# Patient Record
Sex: FEMALE | Race: WHITE | Employment: OTHER | URBAN - METROPOLITAN AREA
[De-identification: names, ages, dates, MRNs, and addresses within clinical notes are randomized per-mention and may not be internally consistent; named-entity substitution may affect disease eponyms.]

---

## 2021-02-18 ENCOUNTER — IMMUNIZATION (OUTPATIENT)
Dept: PRIMARY CARE CLINIC | Age: 71
End: 2021-02-18
Payer: MEDICARE

## 2021-02-18 PROCEDURE — 91301 COVID-19, MODERNA VACCINE 100MCG/0.5ML DOSE: CPT | Performed by: FAMILY MEDICINE

## 2021-02-18 PROCEDURE — 0011A COVID-19, MODERNA VACCINE 100MCG/0.5ML DOSE: CPT | Performed by: FAMILY MEDICINE

## 2021-03-18 ENCOUNTER — IMMUNIZATION (OUTPATIENT)
Dept: PRIMARY CARE CLINIC | Age: 71
End: 2021-03-18
Payer: MEDICARE

## 2021-03-18 PROCEDURE — 0012A PR IMM ADMN SARSCOV2 100 MCG/0.5 ML 2ND DOSE: CPT | Performed by: NURSE PRACTITIONER

## 2021-03-18 PROCEDURE — 91301 COVID-19, MODERNA VACCINE 100MCG/0.5ML DOSE: CPT | Performed by: NURSE PRACTITIONER

## 2021-11-11 ENCOUNTER — OFFICE VISIT (OUTPATIENT)
Dept: CARDIOLOGY CLINIC | Age: 71
End: 2021-11-11
Payer: MEDICARE

## 2021-11-11 VITALS
BODY MASS INDEX: 41.03 KG/M2 | HEIGHT: 60 IN | DIASTOLIC BLOOD PRESSURE: 70 MMHG | HEART RATE: 64 BPM | SYSTOLIC BLOOD PRESSURE: 120 MMHG | WEIGHT: 209 LBS

## 2021-11-11 DIAGNOSIS — R00.2 PALPITATION: Primary | ICD-10-CM

## 2021-11-11 DIAGNOSIS — G47.33 OSA ON CPAP: ICD-10-CM

## 2021-11-11 DIAGNOSIS — Z99.89 OSA ON CPAP: ICD-10-CM

## 2021-11-11 PROCEDURE — G8417 CALC BMI ABV UP PARAM F/U: HCPCS | Performed by: INTERNAL MEDICINE

## 2021-11-11 PROCEDURE — 3017F COLORECTAL CA SCREEN DOC REV: CPT | Performed by: INTERNAL MEDICINE

## 2021-11-11 PROCEDURE — 93246 EXT ECG>7D<15D RECORDING: CPT | Performed by: INTERNAL MEDICINE

## 2021-11-11 PROCEDURE — 1123F ACP DISCUSS/DSCN MKR DOCD: CPT | Performed by: INTERNAL MEDICINE

## 2021-11-11 PROCEDURE — 1036F TOBACCO NON-USER: CPT | Performed by: INTERNAL MEDICINE

## 2021-11-11 PROCEDURE — G8427 DOCREV CUR MEDS BY ELIG CLIN: HCPCS | Performed by: INTERNAL MEDICINE

## 2021-11-11 PROCEDURE — 1090F PRES/ABSN URINE INCON ASSESS: CPT | Performed by: INTERNAL MEDICINE

## 2021-11-11 PROCEDURE — G8484 FLU IMMUNIZE NO ADMIN: HCPCS | Performed by: INTERNAL MEDICINE

## 2021-11-11 PROCEDURE — 4040F PNEUMOC VAC/ADMIN/RCVD: CPT | Performed by: INTERNAL MEDICINE

## 2021-11-11 PROCEDURE — 93000 ELECTROCARDIOGRAM COMPLETE: CPT | Performed by: INTERNAL MEDICINE

## 2021-11-11 PROCEDURE — G8400 PT W/DXA NO RESULTS DOC: HCPCS | Performed by: INTERNAL MEDICINE

## 2021-11-11 PROCEDURE — 99203 OFFICE O/P NEW LOW 30 MIN: CPT | Performed by: INTERNAL MEDICINE

## 2021-11-11 RX ORDER — BUPROPION HYDROCHLORIDE 200 MG/1
200 TABLET, EXTENDED RELEASE ORAL 2 TIMES DAILY
COMMUNITY

## 2021-11-11 RX ORDER — ASPIRIN 81 MG/1
81 TABLET ORAL DAILY
COMMUNITY

## 2021-11-11 RX ORDER — LEVOTHYROXINE SODIUM 137 UG/1
TABLET ORAL
COMMUNITY
Start: 2021-09-18

## 2021-11-11 NOTE — PROGRESS NOTES
[facility]   Cardiac Consultation    Referring Provider:  No primary care provider on file. Chief Complaint   Patient presents with    New Patient     Irregular Heart beat    Palpitations     HPI:  Aislinn Mane is a 78 y.o. female referred by Dr. Helena San for irregular heartbeats. PMH significant for severe IGNACIA on CPAP for the past 25 yrs and palpitations. She has noticed palpitations for most of her life, described as an occasional skipped beat. About 1-2 times a year for the past 20 yrs, she felt forceful palpitations, sometimes rapid, that lasts about 7-10 sec. For the past 3-4 weeks, these episodes now occur 1-2 times a week. She lost about 37 lbs after COVID first hit, but she has gained it all back. The palpitations have increased in frequency for the past 3-4 weeks. She reports compliance with CPAP and sees the sleep physician annually ([facility]). She denies HTN. She has no other cardiac sx and her AHI is low per patient on C-pap. Past Medical History:   has no past medical history on file. Surgical History:   has no past surgical history on file. Social History:   reports that she has never smoked. She has never used smokeless tobacco.     Family History:  family history is not on file. Home Medications:  Outpatient Encounter Medications as of 11/11/2021   Medication Sig Dispense Refill    buPROPion (WELLBUTRIN SR) 200 MG extended release tablet Take 200 mg by mouth 2 times daily      levothyroxine (SYNTHROID) 137 MCG tablet       SERTRALINE HCL PO Take by mouth      aspirin 81 MG EC tablet Take 81 mg by mouth daily       No facility-administered encounter medications on file as of 11/11/2021. Allergies:  Erythromycin     Review of Systems   Constitutional: Negative for activity change and appetite change. HENT: Negative for facial swelling and neck pain. Eyes: Negative for discharge and itching.     Respiratory: Negative for chest tightness and shortness of breath. Cardiovascular: palpitations. Negative for chest pain. Negative for leg swelling. Gastrointestinal: Negative for abdominal pain and abdominal distention. Genitourinary: Negative. Musculoskeletal: Negative. Skin: Negative for color change and pallor. Neurological: Negative for dizziness, syncope and light-headedness. Hematological: Negative. Psychiatric/Behavioral: Negative for behavioral problems and agitation. /70   Pulse 64   Ht 5' (1.524 m)   Wt 209 lb (94.8 kg)   BMI 40.82 kg/m²     ECG 11/11/21, personally reviewed. SR 64    Objective:  Physical Exam   Nursing note and vitals reviewed. Constitutional: She appears well-developed and well-nourished. obese  Head:  atraumatic. Eyes: Right eye exhibits no discharge. Left eye exhibits no discharge. Neck: Neck supple. Cardiovascular: Normal rate, regular rhythm and normal heart sounds. Pulmonary/Chest: Effort normal and breath sounds normal.   Abdominal: Soft. Musculoskeletal: She exhibits no edema. Neurological: She is alert without any gross abnormalities. Skin: Skin is warm and dry. Psychiatric: She has a normal mood and affect. Assessment:  1. Palpitation - Longstanding h/o forceful palpitations that have increased in frequency over the past 3-4 weeks. 2. Severe IGNACIA on CPAP - compliant with CPAP     Suspect atrial tach episodes which are generally benign, but will do 14 day monitor to affirm dx. Since she has gained weight and has Sleep apnea with rapid palps, will order echo to assess for LVH, PHT CM, and atrial size. The advantage of weight reduction with palps( especially atrial) reviewed and encouraged. Plan:  1.  Echo to assess LVEF, structure, and valves  2. Obtain 2 week CAM to correlate her rhythm with her symptoms  3.   Follow up with me in about 4 weeks, after testing at [address] [place] [address], Charlette Glass RN, am scribing for and in the presence of Dr. Tessie Hendrix Kelsy. 11/11/21 1:43 PM  Morene Hammans, RN  The scribes documentation has been prepared under my direction and personally reviewed by me in its entirety. I confirm that the note above accurately reflects all work, treatment, procedures, and medical decision making performed by me. Azael Marinelli.  Kelsy MENDOZA

## 2021-11-12 ENCOUNTER — HOSPITAL ENCOUNTER (OUTPATIENT)
Dept: NON INVASIVE DIAGNOSTICS | Age: 71
Discharge: HOME | End: 2021-11-12
Payer: MEDICARE

## 2021-11-12 DIAGNOSIS — G47.33 OSA ON CPAP: ICD-10-CM

## 2021-11-12 DIAGNOSIS — R00.2 PALPITATION: ICD-10-CM

## 2021-11-12 DIAGNOSIS — Z99.89 OSA ON CPAP: ICD-10-CM

## 2021-11-12 LAB
LV EF: 63 %
LVEF MODALITY: NORMAL

## 2021-11-12 PROCEDURE — 93306 TTE W/DOPPLER COMPLETE: CPT

## 2021-11-16 ENCOUNTER — TELEPHONE (OUTPATIENT)
Dept: CARDIOLOGY CLINIC | Age: 71
End: 2021-11-16

## 2021-11-16 NOTE — TELEPHONE ENCOUNTER
Placed a holter monitor on pt. Date placed was 11/11/2021.  [facility] # is [id number]

## 2021-12-09 DIAGNOSIS — R00.2 PALPITATION: ICD-10-CM

## 2021-12-09 PROCEDURE — 93248 EXT ECG>7D<15D REV&INTERPJ: CPT | Performed by: INTERNAL MEDICINE

## 2021-12-09 NOTE — PROGRESS NOTES
[facility]   Cardiac Consultation    Referring Provider:  Otilio Manrique MD     Chief Complaint   Patient presents with    1 Month Follow-Up     c/o heart flutters still      HPI:  Liz Lorenzana is a 73 y.o. female referred by Dr. Jame Dia for irregular heartbeats. PMH significant for severe IGNACIA on CPAP for the past 25 yrs and palpitations. She has noticed palpitations for most of her life, described as an occasional skipped beat. About 1-2 times a year for the past 20 yrs, she felt forceful palpitations, sometimes rapid, that lasts about 7-10 sec. For the past 3-4 weeks, these episodes now occur 1-2 times a week, more commonly. She lost about 37 lbs after COVID first hit, but she has gained it all back. The palpitations have increased in frequency for the past 3-4 weeks. She reports compliance with CPAP and sees the sleep physician annually ([facility]). She denies HTN. She has no other cardiac sx and her AHI is low per patient on C-pap. Interval History: Today, she present to office for follow up. She continues to report palpitations. She is compliant with her medications. She denies chest pain/pressure, tightness, edema, shortness of breath, heart racing, palpitations, lightheadedness, dizziness, syncope, presyncope,  PND or orthopnea. Past Medical History:   has no past medical history on file. Surgical History:   has no past surgical history on file. Social History:   reports that she has never smoked. She has never used smokeless tobacco.     Family History:  family history is not on file.      Home Medications:  Outpatient Encounter Medications as of 12/14/2021   Medication Sig Dispense Refill    buPROPion (WELLBUTRIN SR) 200 MG extended release tablet Take 200 mg by mouth 2 times daily      levothyroxine (SYNTHROID) 137 MCG tablet       SERTRALINE HCL PO Take 1.5 tablets by mouth daily       aspirin 81 MG EC tablet Take 81 mg by mouth daily       No facility-administered encounter medications on file as of 12/14/2021. Allergies:  Erythromycin     Review of Systems   Constitutional: Negative for activity change and appetite change. HENT: Negative for facial swelling and neck pain. Eyes: Negative for discharge and itching. Respiratory: Negative for chest tightness and shortness of breath. Cardiovascular: palpitations. Negative for chest pain. Negative for leg swelling. Gastrointestinal: Negative for abdominal pain and abdominal distention. Genitourinary: Negative. Musculoskeletal: Negative. Skin: Negative for color change and pallor. Neurological: Negative for dizziness, syncope and light-headedness. Hematological: Negative. Psychiatric/Behavioral: Negative for behavioral problems and agitation. /60   Pulse 64   Ht 5' (1.524 m)   Wt 210 lb 12.8 oz (95.6 kg)   SpO2 98%   BMI 41.17 kg/m²     ECG 12/14/2021 reviewed, sinus rhythm, short MA syndrome Vladimir = 118with v-rate of 65 bpm with QRS duration 88 ms. No pathologic Q waves, ventricular pre-excitation, or QT prolongation. Objective:  Physical Exam   Nursing note and vitals reviewed. Constitutional: She appears well-developed and well-nourished. obese  Head:  atraumatic. Eyes: Right eye exhibits no discharge. Left eye exhibits no discharge. Neck: Neck supple. Cardiovascular: Normal rate, regular rhythm and normal heart sounds. Pulmonary/Chest: Effort normal and breath sounds normal.   Abdominal: Soft. Musculoskeletal: She exhibits no edema. Neurological: She is alert without any gross abnormalities. Skin: Skin is warm and dry. Psychiatric: She has a normal mood and affect. Diagnostics:    Echo: 11/12/2021  Summary  Normal left ventricle size and systolic function with an estimated ejection fraction of 60%-65%. Mild septal hypertrophy. No regional wall motion abnormalities are seen. Normal diastolic function.   Mild mitral regurgitation. Mild to moderate tricuspid regurgitation. 14 day CAM patch monitor 11/11/2021-11/25/2021  Predominate rhythm NSR  Atrial Tachycardia (AT) 58 episodes, longest 12 beats average 103 bpm up to 116 bpm.  Fastest 6 beats @average 151 bpm up to 197 bpm.  PAC 0.66 %. No afib or aflutter seen. Her 2018 tsh was . 02. Assessment:    1. Atrial tachycardia  14 day CAM patch monitor worn from 11/11-11/25/2021 showed episodes of AT which patient were symptomatic with palpitations during the episodes. They were brief. She is on higher dose synthroid thus wonder about contribution there. With post covid weight gain, wonder about that contribution. Discussed starting on Beta blocker. 2. Palpitations Longstanding h/o forceful palpitations that have increased in frequency over the past 3-4 weeks  Echo 11/12/2021 LVEF 60-65 %. 3. Severe IGNACIA on CPAP -complaint with CPAP    4. Exogenous hyperthyroidism  Managed by PCP. Last TSH 3/29/2018 low 0.02, T4 free 1.4 (wnl) , T3 Free 2.7 (wnl),  On levothyroxine 137 mcg daily now ? Thyroid studies    5. Hyperlipidemia   on 11/8/2021. ASCVD 10.2% 10 year intermediate being pursued by Dr. Kemar Linton  CT calcium score completed on 12/2/2021 showed mild atherosclerotic calcifications without aneurysm. Score 23 in the LAD. She is borderline. Plan:  1. Recommended to follow up with physician managing thyroid disease to see if she may take a lower dose of her thyroid. If agreeable,  would wait a couple of weeks to see if her palpitations improve. If they do not,  I would recommend that she start on a beta blocker Toprol XL 25 mg daily. 2. Encouraged healthy weight loss which may help palps and lipids too. 3. Follow up in 3-4 months. Pattie Mary. Kelsy MENDOZA    Cardiac Electrophysiology  [address]  [address] [facility]  [place] [facility] [address]  [phone]    [address] [place] [address]  documentation has been prepared under my direction and personally reviewed by me in its entirety. I confirm that the note above accurately reflects all work, treatment, procedures, and medical decision making performed by me.

## 2021-12-14 ENCOUNTER — OFFICE VISIT (OUTPATIENT)
Dept: CARDIOLOGY CLINIC | Age: 71
End: 2021-12-14
Payer: MEDICARE

## 2021-12-14 VITALS
DIASTOLIC BLOOD PRESSURE: 60 MMHG | WEIGHT: 210.8 LBS | SYSTOLIC BLOOD PRESSURE: 138 MMHG | HEART RATE: 64 BPM | BODY MASS INDEX: 41.39 KG/M2 | OXYGEN SATURATION: 98 % | HEIGHT: 60 IN

## 2021-12-14 DIAGNOSIS — E05.90 HYPERTHYROIDISM: ICD-10-CM

## 2021-12-14 DIAGNOSIS — Z99.89 OSA ON CPAP: ICD-10-CM

## 2021-12-14 DIAGNOSIS — R00.2 PALPITATION: ICD-10-CM

## 2021-12-14 DIAGNOSIS — I47.1 ATRIAL TACHYCARDIA (HCC): Primary | ICD-10-CM

## 2021-12-14 DIAGNOSIS — G47.33 OSA ON CPAP: ICD-10-CM

## 2021-12-14 DIAGNOSIS — E78.5 HYPERLIPIDEMIA, UNSPECIFIED HYPERLIPIDEMIA TYPE: ICD-10-CM

## 2021-12-14 PROCEDURE — 1036F TOBACCO NON-USER: CPT | Performed by: INTERNAL MEDICINE

## 2021-12-14 PROCEDURE — 1123F ACP DISCUSS/DSCN MKR DOCD: CPT | Performed by: INTERNAL MEDICINE

## 2021-12-14 PROCEDURE — G8484 FLU IMMUNIZE NO ADMIN: HCPCS | Performed by: INTERNAL MEDICINE

## 2021-12-14 PROCEDURE — 4040F PNEUMOC VAC/ADMIN/RCVD: CPT | Performed by: INTERNAL MEDICINE

## 2021-12-14 PROCEDURE — 1090F PRES/ABSN URINE INCON ASSESS: CPT | Performed by: INTERNAL MEDICINE

## 2021-12-14 PROCEDURE — G8400 PT W/DXA NO RESULTS DOC: HCPCS | Performed by: INTERNAL MEDICINE

## 2021-12-14 PROCEDURE — G8427 DOCREV CUR MEDS BY ELIG CLIN: HCPCS | Performed by: INTERNAL MEDICINE

## 2021-12-14 PROCEDURE — 3017F COLORECTAL CA SCREEN DOC REV: CPT | Performed by: INTERNAL MEDICINE

## 2021-12-14 PROCEDURE — G8417 CALC BMI ABV UP PARAM F/U: HCPCS | Performed by: INTERNAL MEDICINE

## 2021-12-14 PROCEDURE — 93000 ELECTROCARDIOGRAM COMPLETE: CPT | Performed by: INTERNAL MEDICINE

## 2021-12-14 PROCEDURE — 99214 OFFICE O/P EST MOD 30 MIN: CPT | Performed by: INTERNAL MEDICINE

## 2023-08-16 ENCOUNTER — OFFICE VISIT (OUTPATIENT)
Dept: SLEEP MEDICINE | Age: 73
End: 2023-08-16
Payer: MEDICARE

## 2023-08-16 ENCOUNTER — TELEPHONE (OUTPATIENT)
Dept: PULMONOLOGY | Age: 73
End: 2023-08-16

## 2023-08-16 VITALS
BODY MASS INDEX: 42.21 KG/M2 | RESPIRATION RATE: 18 BRPM | HEIGHT: 60 IN | DIASTOLIC BLOOD PRESSURE: 80 MMHG | HEART RATE: 57 BPM | OXYGEN SATURATION: 96 % | SYSTOLIC BLOOD PRESSURE: 140 MMHG | WEIGHT: 215 LBS

## 2023-08-16 DIAGNOSIS — Z72.821 POOR SLEEP HYGIENE: ICD-10-CM

## 2023-08-16 DIAGNOSIS — Z71.89 CPAP USE COUNSELING: ICD-10-CM

## 2023-08-16 DIAGNOSIS — Z86.69 HISTORY OF SLEEP APNEA: Primary | ICD-10-CM

## 2023-08-16 DIAGNOSIS — E66.01 OBESITY, MORBID, BMI 40.0-49.9 (HCC): ICD-10-CM

## 2023-08-16 DIAGNOSIS — R53.83 OTHER FATIGUE: ICD-10-CM

## 2023-08-16 DIAGNOSIS — F51.04 PSYCHOPHYSIOLOGICAL INSOMNIA: ICD-10-CM

## 2023-08-16 DIAGNOSIS — F32.A ANXIETY AND DEPRESSION: ICD-10-CM

## 2023-08-16 DIAGNOSIS — F45.8 BRUXISM (TEETH GRINDING): ICD-10-CM

## 2023-08-16 DIAGNOSIS — F41.9 ANXIETY AND DEPRESSION: ICD-10-CM

## 2023-08-16 PROCEDURE — 99204 OFFICE O/P NEW MOD 45 MIN: CPT | Performed by: NURSE PRACTITIONER

## 2023-08-16 PROCEDURE — 1090F PRES/ABSN URINE INCON ASSESS: CPT | Performed by: NURSE PRACTITIONER

## 2023-08-16 PROCEDURE — G8417 CALC BMI ABV UP PARAM F/U: HCPCS | Performed by: NURSE PRACTITIONER

## 2023-08-16 PROCEDURE — G8427 DOCREV CUR MEDS BY ELIG CLIN: HCPCS | Performed by: NURSE PRACTITIONER

## 2023-08-16 ASSESSMENT — SLEEP AND FATIGUE QUESTIONNAIRES
HOW LIKELY ARE YOU TO NOD OFF OR FALL ASLEEP WHILE SITTING QUIETLY AFTER LUNCH WITHOUT ALCOHOL: 0
HOW LIKELY ARE YOU TO NOD OFF OR FALL ASLEEP WHILE LYING DOWN TO REST IN THE AFTERNOON WHEN CIRCUMSTANCES PERMIT: 1
HOW LIKELY ARE YOU TO NOD OFF OR FALL ASLEEP WHILE SITTING INACTIVE IN A PUBLIC PLACE: 0
NECK CIRCUMFERENCE (INCHES): 14
HOW LIKELY ARE YOU TO NOD OFF OR FALL ASLEEP WHEN YOU ARE A PASSENGER IN A CAR FOR AN HOUR WITHOUT A BREAK: 0
ESS TOTAL SCORE: 1
HOW LIKELY ARE YOU TO NOD OFF OR FALL ASLEEP WHILE SITTING AND READING: 0
HOW LIKELY ARE YOU TO NOD OFF OR FALL ASLEEP IN A CAR, WHILE STOPPED FOR A FEW MINUTES IN TRAFFIC: 0
HOW LIKELY ARE YOU TO NOD OFF OR FALL ASLEEP WHILE WATCHING TV: 0
HOW LIKELY ARE YOU TO NOD OFF OR FALL ASLEEP WHILE SITTING AND TALKING TO SOMEONE: 0

## 2023-08-16 NOTE — TELEPHONE ENCOUNTER
Need to get sleep study from [facility]. Lm for them to cb.      Pt needs a 40 min appointment after records are obtained

## 2023-08-16 NOTE — PROGRESS NOTES
Patient ID: Duwaine Mortimer is a 81 y.o. female who is being seen today for   Chief Complaint   Patient presents with    New Patient     IGNACIA on cpap ref by Ian Pulido sleep studies 20 years ago     Referring: Dr. Ian Pulido    HPI:   Duwaine Mortimer is a 81 y.o. female in office with  for IGNACIA evaluation. States she was diagnosed with sleep apnea 20 years ago and has been using CPAP since. States she does well with CPAP. States hypersomnia prior to using CPAP. Improved since. States last time she saw a sleep provider was years ago.  has always had a hard time sleeping but it has been worse over the past 6 months. States she has tried melatonin, Ambien, Lunesta with no benefit    Patient is using CPAP   5-6 hrs/night. Not using humidifier. +snoring on CPAP. The pressure is well tolerated. The mask is comfortable- nasal mask. +mask leak. Some daytime sleepiness. No nodding off when driving. No dry nose or throat. Some fatigue. States started [facility] go to sleep program, but does not see benefit. Bedtime is 1130 pm and rise time is 830-9am. Sleep onset is 150 minutes- lays there, might eat, might look at phone or laptop. Wakes up 1 times at night total. 1 nocturia. It takes usually few minutes to fall back a sleep. Occasional naps during the day, 2 hours. No headache in am. 30 weight gain in past 2 yr. 4 caffienated beverages during the day. No alcohol. ESS is 1      No restless feelings in legs at night. No loss of muscle strength when angry or laugh. No hallucination when dozing off or waking up from sleep. No paralysis upon awakening from sleep or going to sleep. +teeth grinding. No nightmares. No sleep walking. No night time panic attacks. No narcotics. No drug abuse. +history of depression and history of anxiety managed per PCP. No history of atrial fibrillation. No history of DM. No history of HTN. No history of ischemic heart disease. No history of stroke. ESS is 1.  No

## 2023-08-17 PROBLEM — E66.01 OBESITY, MORBID, BMI 40.0-49.9 (HCC): Status: ACTIVE | Noted: 2023-08-17

## 2024-01-04 ENCOUNTER — HOSPITAL ENCOUNTER (OUTPATIENT)
Dept: SLEEP CENTER | Age: 74
Discharge: HOME | End: 2024-01-06
Payer: MEDICARE

## 2024-01-04 DIAGNOSIS — R53.83 OTHER FATIGUE: ICD-10-CM

## 2024-01-04 DIAGNOSIS — Z86.69 HISTORY OF SLEEP APNEA: ICD-10-CM

## 2024-01-04 DIAGNOSIS — R06.83 SNORING: ICD-10-CM

## 2024-01-04 DIAGNOSIS — G47.30 SLEEP APNEA, UNSPECIFIED TYPE: ICD-10-CM

## 2024-01-04 DIAGNOSIS — F51.04 PSYCHOPHYSIOLOGICAL INSOMNIA: ICD-10-CM

## 2024-01-04 PROCEDURE — 95811 POLYSOM 6/>YRS CPAP 4/> PARM: CPT

## 2024-01-08 PROBLEM — R53.83 OTHER FATIGUE: Status: ACTIVE | Noted: 2024-01-08

## 2024-01-08 PROBLEM — Z86.69 HISTORY OF SLEEP APNEA: Status: ACTIVE | Noted: 2024-01-08

## 2024-01-08 PROBLEM — R06.83 SNORING: Status: ACTIVE | Noted: 2024-01-08

## 2024-01-08 PROBLEM — G47.30 SLEEP APNEA: Status: ACTIVE | Noted: 2024-01-08

## 2024-01-08 PROBLEM — F51.04 PSYCHOPHYSIOLOGICAL INSOMNIA: Status: ACTIVE | Noted: 2024-01-08

## 2024-04-17 ENCOUNTER — TELEPHONE (OUTPATIENT)
Dept: SLEEP MEDICINE | Age: 74
End: 2024-04-17

## 2024-04-17 ENCOUNTER — OFFICE VISIT (OUTPATIENT)
Dept: SLEEP MEDICINE | Age: 74
End: 2024-04-17
Payer: MEDICARE

## 2024-04-17 ENCOUNTER — TELEPHONE (OUTPATIENT)
Dept: PULMONOLOGY | Age: 74
End: 2024-04-17

## 2024-04-17 VITALS
BODY MASS INDEX: 40.64 KG/M2 | WEIGHT: 207 LBS | HEART RATE: 64 BPM | OXYGEN SATURATION: 98 % | HEIGHT: 60 IN | DIASTOLIC BLOOD PRESSURE: 62 MMHG | SYSTOLIC BLOOD PRESSURE: 122 MMHG

## 2024-04-17 DIAGNOSIS — Z72.821 POOR SLEEP HYGIENE: ICD-10-CM

## 2024-04-17 DIAGNOSIS — F32.A ANXIETY AND DEPRESSION: ICD-10-CM

## 2024-04-17 DIAGNOSIS — E66.01 OBESITY, MORBID, BMI 40.0-49.9 (HCC): ICD-10-CM

## 2024-04-17 DIAGNOSIS — G47.33 MODERATE OBSTRUCTIVE SLEEP APNEA: Primary | ICD-10-CM

## 2024-04-17 DIAGNOSIS — R53.83 OTHER FATIGUE: ICD-10-CM

## 2024-04-17 DIAGNOSIS — F51.04 PSYCHOPHYSIOLOGICAL INSOMNIA: ICD-10-CM

## 2024-04-17 DIAGNOSIS — Z71.89 CPAP USE COUNSELING: ICD-10-CM

## 2024-04-17 DIAGNOSIS — F41.9 ANXIETY AND DEPRESSION: ICD-10-CM

## 2024-04-17 PROCEDURE — 99214 OFFICE O/P EST MOD 30 MIN: CPT | Performed by: NURSE PRACTITIONER

## 2024-04-17 PROCEDURE — 1123F ACP DISCUSS/DSCN MKR DOCD: CPT | Performed by: NURSE PRACTITIONER

## 2024-04-17 PROCEDURE — 1090F PRES/ABSN URINE INCON ASSESS: CPT | Performed by: NURSE PRACTITIONER

## 2024-04-17 PROCEDURE — G8400 PT W/DXA NO RESULTS DOC: HCPCS | Performed by: NURSE PRACTITIONER

## 2024-04-17 PROCEDURE — 3017F COLORECTAL CA SCREEN DOC REV: CPT | Performed by: NURSE PRACTITIONER

## 2024-04-17 PROCEDURE — G8427 DOCREV CUR MEDS BY ELIG CLIN: HCPCS | Performed by: NURSE PRACTITIONER

## 2024-04-17 PROCEDURE — G8417 CALC BMI ABV UP PARAM F/U: HCPCS | Performed by: NURSE PRACTITIONER

## 2024-04-17 PROCEDURE — 1036F TOBACCO NON-USER: CPT | Performed by: NURSE PRACTITIONER

## 2024-04-17 ASSESSMENT — SLEEP AND FATIGUE QUESTIONNAIRES
HOW LIKELY ARE YOU TO NOD OFF OR FALL ASLEEP WHILE SITTING AND READING: WOULD NEVER DOZE
NECK CIRCUMFERENCE (INCHES): 17
HOW LIKELY ARE YOU TO NOD OFF OR FALL ASLEEP WHILE WATCHING TV: WOULD NEVER DOZE
HOW LIKELY ARE YOU TO NOD OFF OR FALL ASLEEP WHILE SITTING AND TALKING TO SOMEONE: WOULD NEVER DOZE
HOW LIKELY ARE YOU TO NOD OFF OR FALL ASLEEP WHILE LYING DOWN TO REST IN THE AFTERNOON WHEN CIRCUMSTANCES PERMIT: SLIGHT CHANCE OF DOZING
HOW LIKELY ARE YOU TO NOD OFF OR FALL ASLEEP WHEN YOU ARE A PASSENGER IN A CAR FOR AN HOUR WITHOUT A BREAK: WOULD NEVER DOZE
ESS TOTAL SCORE: 1
HOW LIKELY ARE YOU TO NOD OFF OR FALL ASLEEP WHILE SITTING QUIETLY AFTER LUNCH WITHOUT ALCOHOL: WOULD NEVER DOZE
HOW LIKELY ARE YOU TO NOD OFF OR FALL ASLEEP IN A CAR, WHILE STOPPED FOR A FEW MINUTES IN TRAFFIC: WOULD NEVER DOZE
HOW LIKELY ARE YOU TO NOD OFF OR FALL ASLEEP WHILE SITTING INACTIVE IN A PUBLIC PLACE: WOULD NEVER DOZE

## 2024-04-17 NOTE — PROGRESS NOTES
had a hard time sleeping but it has been worse over the past 6 months.  States she has tried melatonin, Ambien, Lunesta with no benefit    Patient is using CPAP   5-6 hrs/night. Not using humidifier.+snoring on CPAP. The pressure is well tolerated. The mask is comfortable- nasal mask. +mask leak. Some daytime sleepiness. No nodding off when driving. No dry nose or throat. Some fatigue. States started [facility] go to sleep program, but does not see benefit.  Bedtime is 1130 pm and rise time is 830-9am. Sleep onset is 150 minutes- lays there, might eat, might look at phone or laptop.  Wakes up 1 times at night total. 1 nocturia. It takes usually few minutes to fall back a sleep. Occasional naps during the day, 2 hours. No headache in am. 30 weight gain in past 2 yr. 4 caffienated beverages during the day. No alcohol. ESS is 1      No restless feelings in legs at night. No loss of muscle strength when angry or laugh. No hallucination when dozing off or waking up from sleep. No paralysis upon awakening from sleep or going to sleep. +teeth grinding. No nightmares. No sleep walking. No night time panic attacks. No narcotics. No drug abuse. +history of depression and history of anxiety managed per PCP. No history of atrial fibrillation. No history of DM. No history of HTN. No history of ischemic heart disease. No history of stroke. ESS is 1. No smoking. No known FH for IGNACIA, RLS or narcolepsy.         4/17/2024     8:34 AM 8/16/2023     1:46 PM   Sleep Medicine   Sitting and reading 0 0   Watching TV 0 0   Sitting, inactive in a public place (e.g. a theatre or a meeting) 0 0   As a passenger in a car for an hour without a break 0 0   Lying down to rest in the afternoon when circumstances permit 1 1   Sitting and talking to someone 0 0   Sitting quietly after a lunch without alcohol 0 0   In a car, while stopped for a few minutes in traffic 0 0   Crawford Sleepiness Score 1 1   Neck circumference (Inches) 17 14

## 2024-04-17 NOTE — TELEPHONE ENCOUNTER
Patient taking machine to [facility] for pressure change    Will call once it is changed,   Patient will let us know if she was able to set it up to be able to pull download or she will need to arrange when to bring it in for download in 30 days from pressure change    Needs CR in 30 days

## 2024-04-17 NOTE — TELEPHONE ENCOUNTER
Faxed nasal pillows mask order, pressure change order, full face mask order, nasal mask order, mask fitting order, and ov notes to Susie at [phone] via [employer].

## 2024-05-02 ENCOUNTER — TELEPHONE (OUTPATIENT)
Dept: PULMONOLOGY | Age: 74
End: 2024-05-02

## 2024-05-02 DIAGNOSIS — G47.33 MODERATE OBSTRUCTIVE SLEEP APNEA: Primary | ICD-10-CM

## 2024-05-02 NOTE — TELEPHONE ENCOUNTER
Okay for new auto CPAP 13-17 cm H2O.  Order placed in [facility].  Patient will likely need 31-90-day follow-up appointment, please schedule

## 2024-05-02 NOTE — TELEPHONE ENCOUNTER
Patient is receiving a notification on her cpap that the motor is getting old and it is time for a new machine. Pt is requestng a new order to [facility]. Patient would like to upgrade to [facility] airsense 11.  Last OV 4/17/24

## 2024-05-10 ENCOUNTER — TELEPHONE (OUTPATIENT)
Dept: PULMONOLOGY | Age: 74
End: 2024-05-10

## 2024-05-10 NOTE — TELEPHONE ENCOUNTER
SAUNDRA/DELMIS at [phone] for patient to call back to schedule 31-90 visit between 6/9/24-8/7/24.

## 2024-07-03 ENCOUNTER — TELEPHONE (OUTPATIENT)
Dept: SLEEP MEDICINE | Age: 74
End: 2024-07-03

## 2024-07-03 ENCOUNTER — OFFICE VISIT (OUTPATIENT)
Dept: SLEEP MEDICINE | Age: 74
End: 2024-07-03
Payer: MEDICARE

## 2024-07-03 VITALS
RESPIRATION RATE: 14 BRPM | HEIGHT: 60 IN | WEIGHT: 209 LBS | DIASTOLIC BLOOD PRESSURE: 84 MMHG | HEART RATE: 59 BPM | OXYGEN SATURATION: 96 % | BODY MASS INDEX: 41.03 KG/M2 | SYSTOLIC BLOOD PRESSURE: 144 MMHG

## 2024-07-03 DIAGNOSIS — G47.33 MODERATE OBSTRUCTIVE SLEEP APNEA: Primary | ICD-10-CM

## 2024-07-03 DIAGNOSIS — E66.01 OBESITY, MORBID, BMI 40.0-49.9 (HCC): ICD-10-CM

## 2024-07-03 DIAGNOSIS — Z72.821 POOR SLEEP HYGIENE: ICD-10-CM

## 2024-07-03 DIAGNOSIS — Z71.89 CPAP USE COUNSELING: ICD-10-CM

## 2024-07-03 DIAGNOSIS — F51.04 PSYCHOPHYSIOLOGICAL INSOMNIA: ICD-10-CM

## 2024-07-03 PROCEDURE — 99214 OFFICE O/P EST MOD 30 MIN: CPT | Performed by: NURSE PRACTITIONER

## 2024-07-03 PROCEDURE — G8417 CALC BMI ABV UP PARAM F/U: HCPCS | Performed by: NURSE PRACTITIONER

## 2024-07-03 PROCEDURE — 1036F TOBACCO NON-USER: CPT | Performed by: NURSE PRACTITIONER

## 2024-07-03 PROCEDURE — 1123F ACP DISCUSS/DSCN MKR DOCD: CPT | Performed by: NURSE PRACTITIONER

## 2024-07-03 PROCEDURE — 3017F COLORECTAL CA SCREEN DOC REV: CPT | Performed by: NURSE PRACTITIONER

## 2024-07-03 PROCEDURE — G8400 PT W/DXA NO RESULTS DOC: HCPCS | Performed by: NURSE PRACTITIONER

## 2024-07-03 PROCEDURE — 1090F PRES/ABSN URINE INCON ASSESS: CPT | Performed by: NURSE PRACTITIONER

## 2024-07-03 PROCEDURE — G8427 DOCREV CUR MEDS BY ELIG CLIN: HCPCS | Performed by: NURSE PRACTITIONER

## 2024-07-03 RX ORDER — SERTRALINE HYDROCHLORIDE 100 MG/1
200 TABLET, FILM COATED ORAL DAILY
COMMUNITY
Start: 2024-04-19

## 2024-07-03 RX ORDER — BUPROPION HYDROCHLORIDE 300 MG/1
300 TABLET ORAL DAILY
COMMUNITY
Start: 2024-05-07

## 2024-07-03 ASSESSMENT — SLEEP AND FATIGUE QUESTIONNAIRES
HOW LIKELY ARE YOU TO NOD OFF OR FALL ASLEEP WHILE SITTING AND TALKING TO SOMEONE: WOULD NEVER DOZE
ESS TOTAL SCORE: 2
HOW LIKELY ARE YOU TO NOD OFF OR FALL ASLEEP WHILE SITTING AND READING: WOULD NEVER DOZE
HOW LIKELY ARE YOU TO NOD OFF OR FALL ASLEEP WHILE SITTING INACTIVE IN A PUBLIC PLACE: WOULD NEVER DOZE
HOW LIKELY ARE YOU TO NOD OFF OR FALL ASLEEP IN A CAR, WHILE STOPPED FOR A FEW MINUTES IN TRAFFIC: WOULD NEVER DOZE
HOW LIKELY ARE YOU TO NOD OFF OR FALL ASLEEP WHILE SITTING QUIETLY AFTER LUNCH WITHOUT ALCOHOL: WOULD NEVER DOZE
HOW LIKELY ARE YOU TO NOD OFF OR FALL ASLEEP WHEN YOU ARE A PASSENGER IN A CAR FOR AN HOUR WITHOUT A BREAK: WOULD NEVER DOZE
NECK CIRCUMFERENCE (INCHES): 15
HOW LIKELY ARE YOU TO NOD OFF OR FALL ASLEEP WHILE WATCHING TV: WOULD NEVER DOZE
HOW LIKELY ARE YOU TO NOD OFF OR FALL ASLEEP WHILE LYING DOWN TO REST IN THE AFTERNOON WHEN CIRCUMSTANCES PERMIT: MODERATE CHANCE OF DOZING

## 2024-07-03 NOTE — TELEPHONE ENCOUNTER
Faxed supply order with request for linking to [employer] with [employer]. Faxed OV note to [employer] with [employer].     Patient's machine is not linked to our office and the ID information does not match what karine has on file(Serial # [id number] Lot# [id number] device # 340 ) Refer appointment note on 7/3. This information pulls up a different patient in [facility].  Called [facility] to inform them patient needs get linked. Spoke with carlos from the cpap service dept. She will call the patient and get her current device details as well as inform the local branch of a need for linking.

## 2024-07-03 NOTE — PROGRESS NOTES
Patient ID: Adia Hazel is a 73 y.o. female who is being seen today for   Chief Complaint   Patient presents with    Follow-up     Referring: Dr. Hoda Moyer    HPI:   Adia Hazel is a 73 y.o. female in office for IGNACIA follow up.  She received new CPAP.  States she is doing well with new CPAP.  States she did have to take initial machine and she received back to [facility] and have it swapped out as it was making noise.    Patient is using CPAP   8 hrs/night. Not using humidifier. No snoring on CPAP. The pressure is well tolerated. The mask is comfortable-nasal mask. Some mask leak. No significant daytime sleepiness. No nodding off when driving. No dry nose or throat. No fatigue. Bedtime is 11 pm and rise time is 8 am. Sleep onset is few-60 minutes. Wakes up 2 times at night total. 2 nocturia. It takes few minutes to fall back a sleep. States sleep is still doing much better than previous.  Occasional naps during the day. No headache in am. No weight gain. 3 caffienated beverages during the day. No alcohol. ESS is 2      Previous HPI 4/17/24  Adia Hazel is a 73 y.o. female in office for IGNACIA follow up. CPAP titration was reviewed by me and noted below.  Results were dicussed with patient and multiple good questions were answered.  Pressure change was sent to CPAP via [employer] however unsure if it was actually changed as unable to pull report remotely currently.  Patient did not bring CPAP to office visit today.    States since December she has had the best sleep she has had in decades.  [facility] [place] She did CBT-[facility] and it was a \"traumatic experience\".  [facility] [place] she did see provider at [facility] and CBT-I was recommended as well.  [facility] [place] she has been doing her own research and is following Dr. Mal Villalta and taking supplements magnesium, melatonin (unsure of dose) apigen.      Patient is using CPAP 8 hrs/night. Not using humidifier. No snoring on CPAP. The pressure is well tolerated. The mask is

## 2025-07-08 ENCOUNTER — TELEMEDICINE (OUTPATIENT)
Dept: SLEEP MEDICINE | Age: 75
End: 2025-07-08
Payer: MEDICARE

## 2025-07-08 DIAGNOSIS — G47.33 MODERATE OBSTRUCTIVE SLEEP APNEA: Primary | ICD-10-CM

## 2025-07-08 DIAGNOSIS — E66.01 OBESITY, MORBID, BMI 40.0-49.9 (HCC): ICD-10-CM

## 2025-07-08 DIAGNOSIS — Z71.89 CPAP USE COUNSELING: ICD-10-CM

## 2025-07-08 DIAGNOSIS — F51.04 PSYCHOPHYSIOLOGICAL INSOMNIA: ICD-10-CM

## 2025-07-08 PROCEDURE — 1160F RVW MEDS BY RX/DR IN RCRD: CPT | Performed by: NURSE PRACTITIONER

## 2025-07-08 PROCEDURE — 99214 OFFICE O/P EST MOD 30 MIN: CPT | Performed by: NURSE PRACTITIONER

## 2025-07-08 PROCEDURE — 1159F MED LIST DOCD IN RCRD: CPT | Performed by: NURSE PRACTITIONER

## 2025-07-08 PROCEDURE — 3017F COLORECTAL CA SCREEN DOC REV: CPT | Performed by: NURSE PRACTITIONER

## 2025-07-08 PROCEDURE — 1090F PRES/ABSN URINE INCON ASSESS: CPT | Performed by: NURSE PRACTITIONER

## 2025-07-08 PROCEDURE — 1123F ACP DISCUSS/DSCN MKR DOCD: CPT | Performed by: NURSE PRACTITIONER

## 2025-07-08 PROCEDURE — G8427 DOCREV CUR MEDS BY ELIG CLIN: HCPCS | Performed by: NURSE PRACTITIONER

## 2025-07-08 PROCEDURE — G8400 PT W/DXA NO RESULTS DOC: HCPCS | Performed by: NURSE PRACTITIONER

## 2025-07-08 ASSESSMENT — SLEEP AND FATIGUE QUESTIONNAIRES
HOW LIKELY ARE YOU TO NOD OFF OR FALL ASLEEP IN A CAR, WHILE STOPPED FOR A FEW MINUTES IN TRAFFIC: WOULD NEVER DOZE
HOW LIKELY ARE YOU TO NOD OFF OR FALL ASLEEP WHILE SITTING INACTIVE IN A PUBLIC PLACE: WOULD NEVER DOZE
HOW LIKELY ARE YOU TO NOD OFF OR FALL ASLEEP WHILE WATCHING TV: WOULD NEVER DOZE
HOW LIKELY ARE YOU TO NOD OFF OR FALL ASLEEP WHILE SITTING AND READING: WOULD NEVER DOZE
ESS TOTAL SCORE: 3
HOW LIKELY ARE YOU TO NOD OFF OR FALL ASLEEP WHILE SITTING QUIETLY AFTER LUNCH WITHOUT ALCOHOL: WOULD NEVER DOZE
HOW LIKELY ARE YOU TO NOD OFF OR FALL ASLEEP WHEN YOU ARE A PASSENGER IN A CAR FOR AN HOUR WITHOUT A BREAK: SLIGHT CHANCE OF DOZING
HOW LIKELY ARE YOU TO NOD OFF OR FALL ASLEEP WHILE LYING DOWN TO REST IN THE AFTERNOON WHEN CIRCUMSTANCES PERMIT: MODERATE CHANCE OF DOZING
HOW LIKELY ARE YOU TO NOD OFF OR FALL ASLEEP WHILE SITTING AND TALKING TO SOMEONE: WOULD NEVER DOZE

## 2025-07-08 NOTE — PROGRESS NOTES
dicussed with patient and multiple good questions were answered.  Pressure change was sent to CPAP via [employer] however unsure if it was actually changed as unable to pull report remotely currently.  Patient did not bring CPAP to office visit today.    [facility] since December she has had the best sleep she has had in decades.  [facility] She did CBT-I and it was a \"traumatic experience\".  [facility] she did see provider at [facility] and CBT-I was recommended as well.  [facility] she has been doing her own research and is following Dr. Mal Villalta and taking supplements magnesium, melatonin (unsure of dose) apigen.      Patient is using CPAP 8 hrs/night. Not using humidifier. No snoring on CPAP. The pressure is well tolerated. The mask is comfortable- nasal mask. No mask leak. No significant daytime sleepiness. No nodding off when driving. +dry mouth.  Some fatigue. Bedtime is 11 pm and rise time is 8 am. Sleep onset is few-20 minutes. Wakes up 2 times at night total. 2 nocturia. It takes few minutes to fall back a sleep. Occasional naps during the day. No headache in am. No weight gain. 3-4 caffienated beverages during the day. No alcohol. ESS is 1    States seeing psychiatry feels depression pretty well controlled, [facility] medications recently adjusted      Initial HPI 8/16/23  Adia Hazel is a 74 y.o. female in office with  for IGNACIA evaluation.  [facility] she was diagnosed with sleep apnea 20 years ago and has been using CPAP since.  [facility] she does well with CPAP.  States hypersomnia prior to using CPAP.  Improved since.  [facility] last time she saw a sleep provider was years ago. States has always had a hard time sleeping but it has been worse over the past 6 months.  [facility] she has tried melatonin, Ambien, Lunesta with no benefit    Patient is using CPAP   5-6 hrs/night. Not using humidifier.+snoring on CPAP. The pressure is well tolerated. The mask is comfortable- nasal mask. +mask leak. Some daytime